# Patient Record
Sex: FEMALE | Race: WHITE | NOT HISPANIC OR LATINO
[De-identification: names, ages, dates, MRNs, and addresses within clinical notes are randomized per-mention and may not be internally consistent; named-entity substitution may affect disease eponyms.]

---

## 2017-04-13 ENCOUNTER — RESULT REVIEW (OUTPATIENT)
Age: 42
End: 2017-04-13

## 2017-04-14 ENCOUNTER — OUTPATIENT (OUTPATIENT)
Dept: OUTPATIENT SERVICES | Facility: HOSPITAL | Age: 42
LOS: 1 days | Discharge: HOME | End: 2017-04-14
Payer: COMMERCIAL

## 2017-04-14 ENCOUNTER — TRANSCRIPTION ENCOUNTER (OUTPATIENT)
Age: 42
End: 2017-04-14

## 2017-04-14 DIAGNOSIS — K62.5 HEMORRHAGE OF ANUS AND RECTUM: ICD-10-CM

## 2017-04-14 DIAGNOSIS — Z98.89 OTHER SPECIFIED POSTPROCEDURAL STATES: Chronic | ICD-10-CM

## 2017-04-14 DIAGNOSIS — C43.22 MALIGNANT MELANOMA OF LEFT EAR AND EXTERNAL AURICULAR CANAL: Chronic | ICD-10-CM

## 2017-04-14 PROCEDURE — 88305 TISSUE EXAM BY PATHOLOGIST: CPT

## 2017-04-14 PROCEDURE — 45380 COLONOSCOPY AND BIOPSY: CPT

## 2017-04-14 PROCEDURE — 88305 TISSUE EXAM BY PATHOLOGIST: CPT | Mod: 26

## 2017-04-19 DIAGNOSIS — I48.91 UNSPECIFIED ATRIAL FIBRILLATION: ICD-10-CM

## 2017-04-19 DIAGNOSIS — E72.12 METHYLENETETRAHYDROFOLATE REDUCTASE DEFICIENCY: ICD-10-CM

## 2017-04-19 DIAGNOSIS — E03.9 HYPOTHYROIDISM, UNSPECIFIED: ICD-10-CM

## 2017-04-19 DIAGNOSIS — K64.8 OTHER HEMORRHOIDS: ICD-10-CM

## 2017-04-19 DIAGNOSIS — D68.51 ACTIVATED PROTEIN C RESISTANCE: ICD-10-CM

## 2017-04-19 DIAGNOSIS — E73.9 LACTOSE INTOLERANCE, UNSPECIFIED: ICD-10-CM

## 2017-04-19 DIAGNOSIS — D12.4 BENIGN NEOPLASM OF DESCENDING COLON: ICD-10-CM

## 2017-04-19 DIAGNOSIS — Z88.3 ALLERGY STATUS TO OTHER ANTI-INFECTIVE AGENTS: ICD-10-CM

## 2017-06-28 ENCOUNTER — RESULT REVIEW (OUTPATIENT)
Age: 42
End: 2017-06-28

## 2018-03-09 ENCOUNTER — LABORATORY RESULT (OUTPATIENT)
Age: 43
End: 2018-03-09

## 2018-03-09 ENCOUNTER — APPOINTMENT (OUTPATIENT)
Dept: SURGICAL ONCOLOGY | Facility: CLINIC | Age: 43
End: 2018-03-09
Payer: COMMERCIAL

## 2018-03-09 VITALS
BODY MASS INDEX: 24.4 KG/M2 | HEART RATE: 107 BPM | WEIGHT: 161 LBS | HEIGHT: 68 IN | SYSTOLIC BLOOD PRESSURE: 128 MMHG | DIASTOLIC BLOOD PRESSURE: 82 MMHG | OXYGEN SATURATION: 100 %

## 2018-03-09 PROCEDURE — 38505 NEEDLE BIOPSY LYMPH NODES: CPT

## 2018-03-09 PROCEDURE — 99214 OFFICE O/P EST MOD 30 MIN: CPT | Mod: 25

## 2018-03-13 ENCOUNTER — FORM ENCOUNTER (OUTPATIENT)
Age: 43
End: 2018-03-13

## 2018-03-14 ENCOUNTER — APPOINTMENT (OUTPATIENT)
Dept: RADIOLOGY | Facility: CLINIC | Age: 43
End: 2018-03-14
Payer: COMMERCIAL

## 2018-03-14 ENCOUNTER — TRANSCRIPTION ENCOUNTER (OUTPATIENT)
Age: 43
End: 2018-03-14

## 2018-03-14 ENCOUNTER — OUTPATIENT (OUTPATIENT)
Dept: OUTPATIENT SERVICES | Facility: HOSPITAL | Age: 43
LOS: 1 days | End: 2018-03-14
Payer: COMMERCIAL

## 2018-03-14 DIAGNOSIS — Z00.8 ENCOUNTER FOR OTHER GENERAL EXAMINATION: ICD-10-CM

## 2018-03-14 DIAGNOSIS — Z98.89 OTHER SPECIFIED POSTPROCEDURAL STATES: Chronic | ICD-10-CM

## 2018-03-14 DIAGNOSIS — C43.22 MALIGNANT MELANOMA OF LEFT EAR AND EXTERNAL AURICULAR CANAL: Chronic | ICD-10-CM

## 2018-03-14 LAB
ALBUMIN SERPL ELPH-MCNC: 4.5 G/DL
ALP BLD-CCNC: 53 U/L
ALT SERPL-CCNC: 15 U/L
ANION GAP SERPL CALC-SCNC: 21 MMOL/L
AST SERPL-CCNC: 17 U/L
BASOPHILS # BLD AUTO: 0.02 K/UL
BASOPHILS NFR BLD AUTO: 0.2 %
BILIRUB SERPL-MCNC: 0.2 MG/DL
BUN SERPL-MCNC: 9 MG/DL
CALCIUM SERPL-MCNC: 9.8 MG/DL
CHLORIDE SERPL-SCNC: 103 MMOL/L
CO2 SERPL-SCNC: 22 MMOL/L
CREAT SERPL-MCNC: 0.91 MG/DL
EOSINOPHIL # BLD AUTO: 0.26 K/UL
EOSINOPHIL NFR BLD AUTO: 2.9 %
GLUCOSE SERPL-MCNC: 83 MG/DL
HCT VFR BLD CALC: 40.6 %
HGB BLD-MCNC: 13.7 G/DL
IMM GRANULOCYTES NFR BLD AUTO: 0.3 %
LDH SERPL-CCNC: 252 U/L
LYMPHOCYTES # BLD AUTO: 2.62 K/UL
LYMPHOCYTES NFR BLD AUTO: 29.6 %
MAN DIFF?: NORMAL
MCHC RBC-ENTMCNC: 28.5 PG
MCHC RBC-ENTMCNC: 33.7 GM/DL
MCV RBC AUTO: 84.6 FL
MONOCYTES # BLD AUTO: 0.56 K/UL
MONOCYTES NFR BLD AUTO: 6.3 %
NEUTROPHILS # BLD AUTO: 5.37 K/UL
NEUTROPHILS NFR BLD AUTO: 60.7 %
PLATELET # BLD AUTO: 299 K/UL
POTASSIUM SERPL-SCNC: 4.3 MMOL/L
PROT SERPL-MCNC: 7.5 G/DL
RBC # BLD: 4.8 M/UL
RBC # FLD: 13.1 %
SODIUM SERPL-SCNC: 146 MMOL/L
WBC # FLD AUTO: 8.86 K/UL

## 2018-03-14 PROCEDURE — 71046 X-RAY EXAM CHEST 2 VIEWS: CPT

## 2018-03-14 PROCEDURE — 71046 X-RAY EXAM CHEST 2 VIEWS: CPT | Mod: 26

## 2018-06-12 ENCOUNTER — RESULT REVIEW (OUTPATIENT)
Age: 43
End: 2018-06-12

## 2018-09-19 ENCOUNTER — APPOINTMENT (OUTPATIENT)
Dept: SURGICAL ONCOLOGY | Facility: CLINIC | Age: 43
End: 2018-09-19
Payer: COMMERCIAL

## 2018-09-19 VITALS
HEIGHT: 68 IN | SYSTOLIC BLOOD PRESSURE: 103 MMHG | DIASTOLIC BLOOD PRESSURE: 69 MMHG | WEIGHT: 154 LBS | BODY MASS INDEX: 23.34 KG/M2 | HEART RATE: 85 BPM | RESPIRATION RATE: 15 BRPM

## 2018-09-19 PROCEDURE — 99214 OFFICE O/P EST MOD 30 MIN: CPT

## 2019-03-20 ENCOUNTER — APPOINTMENT (OUTPATIENT)
Dept: SURGICAL ONCOLOGY | Facility: CLINIC | Age: 44
End: 2019-03-20
Payer: COMMERCIAL

## 2019-03-20 VITALS
HEART RATE: 92 BPM | DIASTOLIC BLOOD PRESSURE: 77 MMHG | HEIGHT: 68 IN | BODY MASS INDEX: 22.73 KG/M2 | SYSTOLIC BLOOD PRESSURE: 114 MMHG | WEIGHT: 150 LBS | RESPIRATION RATE: 15 BRPM

## 2019-03-20 PROCEDURE — 99214 OFFICE O/P EST MOD 30 MIN: CPT

## 2019-03-20 NOTE — HISTORY OF PRESENT ILLNESS
[de-identified] : 42 y/o female presents for follow up of bilateral T1 ear melanomas. \par She is status post wide excision right posterior ear T1 melanoma 12/15 T1 melanoma left posterior ear 8/16 w reconstruction, right chest skin graft. \par \par She reports a recent visit to her dermatologist, no new biopsies. \par LDH levels 3/15/19: 146 within normal limits.\par CXR 3/14/2018: clear lungs\par Left posterior auricular LN no longer palpable.\par \par Today she is without any complaints. \par Denies any new skin lesions, changes, or pruritic moles.\par Denies constitutional symptoms. \par Denies fever or chills.

## 2019-03-20 NOTE — CONSULT LETTER
[Dear  ___] : Dear  [unfilled], [Please see my note below.] : Please see my note below. [Courtesy Letter:] : I had the pleasure of seeing your patient, [unfilled], in my office today. [Sincerely,] : Sincerely, [DrAdan  ___] : Dr. CHANDRA [FreeTextEntry3] : Jose Almanzar MD FACS

## 2019-03-20 NOTE — ADDENDUM
[FreeTextEntry1] : I, Sukhi Wolf, acted solely as a scribe for Dr. Jose Almanzar on this date 3/20/19.

## 2019-03-20 NOTE — ASSESSMENT
[FreeTextEntry1] : Bilateral stage I ear melanomas\par LDH normal 3/2019\par Continue dermatologic surveillance\par RTO 6 months.

## 2019-03-20 NOTE — PHYSICAL EXAM
[Normal] : supple, no neck mass and thyroid not enlarged [Normal Neck Lymph Nodes] : normal neck lymph nodes  [Normal Supraclavicular Lymph Nodes] : normal supraclavicular lymph nodes [Normal Groin Lymph Nodes] : normal groin lymph nodes [Normal Axillary Lymph Nodes] : normal axillary lymph nodes [Normal] : oriented to person, place and time, with appropriate affect [de-identified] : right ear reconstruction well healed. No evidence of recurrence. Left ear reconstruction healed as well. No evidence of recurrence.

## 2019-06-11 ENCOUNTER — RESULT REVIEW (OUTPATIENT)
Age: 44
End: 2019-06-11

## 2019-07-10 ENCOUNTER — TRANSCRIPTION ENCOUNTER (OUTPATIENT)
Age: 44
End: 2019-07-10

## 2019-09-25 ENCOUNTER — APPOINTMENT (OUTPATIENT)
Dept: SURGICAL ONCOLOGY | Facility: CLINIC | Age: 44
End: 2019-09-25
Payer: COMMERCIAL

## 2019-09-25 VITALS
HEIGHT: 68 IN | SYSTOLIC BLOOD PRESSURE: 130 MMHG | WEIGHT: 157 LBS | BODY MASS INDEX: 23.79 KG/M2 | OXYGEN SATURATION: 100 % | RESPIRATION RATE: 15 BRPM | DIASTOLIC BLOOD PRESSURE: 85 MMHG | HEART RATE: 88 BPM

## 2019-09-25 PROCEDURE — 99214 OFFICE O/P EST MOD 30 MIN: CPT

## 2019-09-25 NOTE — PHYSICAL EXAM
[Normal] : supple, no neck mass and thyroid not enlarged [Normal Neck Lymph Nodes] : normal neck lymph nodes  [Normal Supraclavicular Lymph Nodes] : normal supraclavicular lymph nodes [Normal Groin Lymph Nodes] : normal groin lymph nodes [Normal Axillary Lymph Nodes] : normal axillary lymph nodes [Normal] : oriented to person, place and time, with appropriate affect [de-identified] : Bilateral ear reconstruction well healed. No evidence of recurrence. Multiple benign-appearing pigmented lesions head, neck, trunk, and extremities.

## 2019-09-25 NOTE — HISTORY OF PRESENT ILLNESS
[de-identified] : 44 y/o female presents for follow up of bilateral T1 ear melanomas. \par She is status post wide excision right posterior ear T1 melanoma 12/15 T1 melanoma left posterior ear 8/16 w reconstruction, right chest skin graft. \par \par She reports no new biopsies and that she will be following up with her dermatologist shortly.\par LDH levels 3/15/19: 146 within normal limits.\par CXR 3/14/2018: clear lungs\par Left posterior auricular LN no longer palpable.\par \par Diagnostic Left Breast US/MMG (4/5/19): Left-suspicious 4:00 N2 1.2cm dilated ductal structure with echogenic material, possible papilloma. Recommend left breast biopsy. BIRADS-4A\par She reports benign breast biopsy and 6 month follow-up left MMG scheduled for next week.\par \par Today she is without any complaints. \par Denies any new skin lesions, changes, or pruritic moles.\par Denies constitutional symptoms. \par Denies fever or chills.

## 2019-09-25 NOTE — ADDENDUM
[FreeTextEntry1] : I, Manolo Claire, acted solely as a scribe for Dr. Jose Almanzar on this date 09/25/2019.

## 2019-09-25 NOTE — ASSESSMENT
[FreeTextEntry1] : Bilateral stage I ear melanomas\par LDH WNL 3/2019\par Continue dermatologic surveillance\par 6 month follow-up left MMG scheduled for next week\par RTO 6 months.

## 2019-09-25 NOTE — CONSULT LETTER
[Dear  ___] : Dear  [unfilled], [Courtesy Letter:] : I had the pleasure of seeing your patient, [unfilled], in my office today. [Please see my note below.] : Please see my note below. [Sincerely,] : Sincerely, [DrAdan  ___] : Dr. CHANDRA [FreeTextEntry3] : Jose Almanzar MD FACS

## 2020-04-15 ENCOUNTER — APPOINTMENT (OUTPATIENT)
Dept: SURGICAL ONCOLOGY | Facility: CLINIC | Age: 45
End: 2020-04-15

## 2020-09-01 ENCOUNTER — RESULT REVIEW (OUTPATIENT)
Age: 45
End: 2020-09-01

## 2020-11-08 ENCOUNTER — APPOINTMENT (OUTPATIENT)
Dept: DISASTER EMERGENCY | Facility: CLINIC | Age: 45
End: 2020-11-08

## 2020-11-08 DIAGNOSIS — Z01.818 ENCOUNTER FOR OTHER PREPROCEDURAL EXAMINATION: ICD-10-CM

## 2020-11-10 ENCOUNTER — TRANSCRIPTION ENCOUNTER (OUTPATIENT)
Age: 45
End: 2020-11-10

## 2020-11-11 ENCOUNTER — OUTPATIENT (OUTPATIENT)
Dept: OUTPATIENT SERVICES | Facility: HOSPITAL | Age: 45
LOS: 1 days | End: 2020-11-11
Payer: COMMERCIAL

## 2020-11-11 DIAGNOSIS — K62.5 HEMORRHAGE OF ANUS AND RECTUM: ICD-10-CM

## 2020-11-11 DIAGNOSIS — Z98.89 OTHER SPECIFIED POSTPROCEDURAL STATES: Chronic | ICD-10-CM

## 2020-11-11 DIAGNOSIS — C43.22 MALIGNANT MELANOMA OF LEFT EAR AND EXTERNAL AURICULAR CANAL: Chronic | ICD-10-CM

## 2020-11-11 LAB — HCG UR QL: NEGATIVE — SIGNIFICANT CHANGE UP

## 2020-11-11 PROCEDURE — 81025 URINE PREGNANCY TEST: CPT

## 2020-11-11 PROCEDURE — 45378 DIAGNOSTIC COLONOSCOPY: CPT

## 2021-01-24 LAB — SARS-COV-2 N GENE NPH QL NAA+PROBE: NOT DETECTED

## 2021-02-04 ENCOUNTER — APPOINTMENT (OUTPATIENT)
Dept: ORTHOPEDIC SURGERY | Facility: CLINIC | Age: 46
End: 2021-02-04
Payer: COMMERCIAL

## 2021-02-04 VITALS
BODY MASS INDEX: 25.46 KG/M2 | HEIGHT: 68 IN | HEART RATE: 101 BPM | WEIGHT: 168 LBS | SYSTOLIC BLOOD PRESSURE: 115 MMHG | DIASTOLIC BLOOD PRESSURE: 82 MMHG

## 2021-02-04 DIAGNOSIS — M62.830 MUSCLE SPASM OF BACK: ICD-10-CM

## 2021-02-04 PROCEDURE — 73080 X-RAY EXAM OF ELBOW: CPT | Mod: RT

## 2021-02-04 PROCEDURE — 99072 ADDL SUPL MATRL&STAF TM PHE: CPT

## 2021-02-04 PROCEDURE — 99204 OFFICE O/P NEW MOD 45 MIN: CPT | Mod: 25

## 2021-02-04 PROCEDURE — 20605 DRAIN/INJ JOINT/BURSA W/O US: CPT | Mod: RT

## 2021-02-04 RX ORDER — MELOXICAM 7.5 MG/1
7.5 TABLET ORAL
Qty: 21 | Refills: 0 | Status: COMPLETED | COMMUNITY
Start: 2021-02-04 | End: 2021-02-25

## 2021-02-04 RX ORDER — CYCLOBENZAPRINE HYDROCHLORIDE 10 MG/1
10 TABLET, FILM COATED ORAL
Qty: 7 | Refills: 0 | Status: ACTIVE | COMMUNITY
Start: 2021-02-04 | End: 1900-01-01

## 2021-02-20 NOTE — PROCEDURE
[de-identified] : Injection: Right elbow.\par Indication: Lateral epicondylitis.\par \par A discussion was had with the patient regarding this procedure and all questions were answered. All risks, benefits and alternatives were discussed. These included but were not limited to bleeding, infection, and allergic reaction. Alcohol was used to clean the skin, and betadine was used to sterilize and prep the area in the lateral aspect of the right elbow. Ethyl chloride spray was then used as a topical anesthetic. A 25-gauge needle was used to inject 1cc of 1% xylocaine and 1cc of 40mg/mL triamcinolone acetonide into the right lateral epicondyle using a needling technique. A sterile bandage was then applied. The patient tolerated the procedure well and there were no complications\par \par HEP provided to her today

## 2021-02-20 NOTE — HISTORY OF PRESENT ILLNESS
[Worsening] : worsening [___ mths] : [unfilled] month(s) ago [3] : an average pain level of 3/10 [de-identified] : SAMSON ROBERTS is a 45 year female being seen for R elbow pain that began 1 month ago. Patient reports the pain is worst at night and when she first wakes up in the morning. She admits to pain and weaknesx in both the R elbow and R hand, espeically when she is grabbingh. She also reports shooting pain and numbness and tingling in the R UE. Patient reports taking naproxen for pain which has provided minimal relief. P/x has h/x of SLAP repair in 2010/2011. \par  [de-identified] : grabbing, sleeping

## 2021-02-20 NOTE — PHYSICAL EXAM
[de-identified] : Physical Exam:\par General: Well appearing, no acute distress\par Neurologic: A&Ox3, No focal deficits\par Head: NCAT without abrasions, lacerations, or ecchymosis to head, face, or scalp\par Eyes: No scleral icterus, no gross abnormalities\par Respiratory: Equal chest wall expansion bilaterally, no accessory muscle use\par Lymphatic: No lymphadenopathy palpated\par Skin: Warm and dry\par Psychiatric: Normal mood and affect\par \par Elbow Exam\par \par Skin: Clean, dry, intact. No ecchymosis. No swelling. No palpable joint effusion.\par ROM: RIGHT 0-130, full supination/pronation.  LEFT 0-140, full supination/pronation.\par Painful ROM: Pronation to lateral right elbow\par Tenderness: [No] medial epicondyle pain. Lateral epicondyle pain. [No] olecranon pain. No pain at radial head.\par Strength: 5/5 elbow flexion, 5/5 elbow extension, 5/5 supination, 5/5 pronation\par Stability: Stable to vaus/valgus stress\par Vasc: 2+ radial pulse, <2s cap refill\par Sensation: In tact to light touch throughout\par Neuro: Negative tinels at ulnar canal, AIN/PIN/Ulnar nerve in tact to motor/sensation.\par \par Special Tests:\par Dorsiflexion Against Resistance: Negative\par Flexion of Wrist Against Resistance: POS\par Resistance Against Pronation: POS\par Resistance Against Supination: Negative\par Tinel's Sign Cubital Tunnel: Negative [de-identified] : 3 views of R elbow obtained today reveals no dislocation, bony lesions, or deformities. No signs of degenerative changes.

## 2021-02-20 NOTE — DISCUSSION/SUMMARY
[de-identified] : SAMSON is a 45 year female who presents today with complaints of right elbow pain secondary to lateral epicondylitis.\par \par I discussed with the patient the treatment of lateral epicondylitis. I discussed that this is a degenerative condition rather than an inflammatory process. The process is reversible, and the best source of treatment is to reduce the offending repetitive overuse injury process. I counseled the patient how to do this. In addition, treatment includes counterforce bracing, physical therapy for stretching and strengthening, and the use of injection therapy (steroids/PRP etc). I also discussed the role of surgical intervention for may become a chronic condition.\par \par At this time she elected for conservative measures with elbow compression band during all daytime activities, wrist splint for all nighttime use which was provided to her today, formal physical therapy starting in 1 week from today if the HEP provided her doesn't help her within 2 weeks of performing, and a prescription for meloxicam once daily for 21 days. I counseled the patient on the side effects of meloxicam and the possible negative sequelae.. If she does develop any acid reflux or abdominal pain they should stop the medication and then call us. We'll see her back in 6-8 weeks for clinical reassessment. We also discussed her muscle spasm to her back of which I provided her with flexiril for QHS prn. She will utilize heat and massage for this, otherwise. She agrees with the above plan all questions were answered.

## 2021-09-16 ENCOUNTER — RESULT REVIEW (OUTPATIENT)
Age: 46
End: 2021-09-16

## 2021-10-27 ENCOUNTER — NON-APPOINTMENT (OUTPATIENT)
Age: 46
End: 2021-10-27

## 2021-11-01 ENCOUNTER — APPOINTMENT (OUTPATIENT)
Dept: ORTHOPEDIC SURGERY | Facility: CLINIC | Age: 46
End: 2021-11-01
Payer: COMMERCIAL

## 2021-11-01 VITALS
HEART RATE: 84 BPM | DIASTOLIC BLOOD PRESSURE: 78 MMHG | BODY MASS INDEX: 25.46 KG/M2 | SYSTOLIC BLOOD PRESSURE: 112 MMHG | HEIGHT: 68 IN | WEIGHT: 168 LBS

## 2021-11-01 DIAGNOSIS — S46.811A STRAIN OF OTHER MUSCLES, FASCIA AND TENDONS AT SHOULDER AND UPPER ARM LEVEL, RIGHT ARM, INITIAL ENCOUNTER: ICD-10-CM

## 2021-11-01 PROCEDURE — 99214 OFFICE O/P EST MOD 30 MIN: CPT | Mod: 25

## 2021-11-01 PROCEDURE — 20605 DRAIN/INJ JOINT/BURSA W/O US: CPT | Mod: RT

## 2021-11-01 RX ORDER — DICLOFENAC SODIUM 75 MG/1
75 TABLET, DELAYED RELEASE ORAL
Qty: 60 | Refills: 0 | Status: ACTIVE | COMMUNITY
Start: 2021-11-01 | End: 1900-01-01

## 2021-11-01 NOTE — HISTORY OF PRESENT ILLNESS
[Worsening] : worsening [3] : an average pain level of 3/10 [___ mths] : [unfilled] month(s) ago [de-identified] : SAMSON ROBERTS is a 45 year female being seen for f/u visit of R elbow pain that began 10 month ago. Currently, Patient reports the pain is worst at night and when she first wakes up in the morning. She admits to pain and weakness in both the R elbow and R hand, especially when she is grabbing. She also reports shooting pain and numbness and tingling in the R UE. Patient reports taking naproxen for pain which has provided minimal relief. P/x has h/x of SLAP repair in 2010/2011.  [de-identified] : grabbing, sleeping

## 2021-11-01 NOTE — ADDENDUM
[FreeTextEntry1] : Documented by Malik Hoffman acting as a scribe for Dr. Pereyra on 11/01/2021. \par \par All medical record entries made by the Scribe were at my, Dr. Pereyra's, direction and\par personally dictated by me on 11/01/2021. I have reviewed the chart and agree that the record\par accurately reflects my personal performance of the history, physical exam, procedure and imaging.

## 2021-11-01 NOTE — DISCUSSION/SUMMARY
[de-identified] : SAMSON ROBERTS is a 45 year female being seen for f/u visit of R elbow pain that began 10 month ago. Currently, Patient reports the pain is worst at night and when she first wakes up in the morning. She admits to pain and weakness in both the R elbow and R hand, especially when she is grabbing. She also reports shooting pain and numbness and tingling in the R UE. Patient reports taking naproxen for pain which has provided minimal relief. P/x has h/x of SLAP repair in 2010/2011. \par \par I discussed with the patient the treatment of lateral epicondylitis. I discussed that this is a degenerative condition rather than an inflammatory process. The process is reversible, and the best source of treatment is to reduce the offending repetitive overuse injury process. I counseled the patient how to do this. In addition, treatment includes counterforce bracing, physical therapy for stretching and strengthening, and the use of injection therapy (steroids/PRP etc). I also discussed the role of surgical intervention for may become a chronic condition.\par \par Pt due to her acute pain elected for a corticosteroid injection at today's visit and tolerated the procedure well. She should take it easy for the next 2-3 days while icing the joint. Conservative measures of treatment include rest until asymptomatic, activity avoidance, NSAID's PRN, application to ice to the area 2-3x daily for 20 minutes, with gradual return to activities. A prescription of Diclofenac was given and patient was informed about its risks and benefits. Patient will follow up in 6-8 wks for repeat clinical assessment. All questions were answered and the patient verbalized understanding. The patient is in agreement with this treatment plan. \par \par \par For her trapezial strain I recommend oral anti-inflammatories and physical therapy.  She agrees with above plan.\par \par

## 2021-11-01 NOTE — PROCEDURE
[de-identified] : Injection: Right elbow.\par Indication: Lateral epicondylitis.\par \par A discussion was had with the patient regarding this procedure and all questions were answered. All risks, benefits and alternatives were discussed. These included but were not limited to bleeding, infection, and allergic reaction. Alcohol was used to clean the skin, and betadine was used to sterilize and prep the area in the lateral aspect of the elbow. Ethyl chloride spray was then used as a topical anesthetic. A 25-gauge needle was used to inject 1cc of 1% xylocaine and 1cc of 40mg/mL triamcinolone acetonide into the lateral epicondyle using a needling technique. A sterile bandage was then applied. The patient tolerated the procedure well and there were no complications

## 2021-11-01 NOTE — PHYSICAL EXAM
[de-identified] : Physical Exam:\par General: Well appearing, no acute distress\par Neurologic: A&Ox3, No focal deficits\par Head: NCAT without abrasions, lacerations, or ecchymosis to head, face, or scalp\par Eyes: No scleral icterus, no gross abnormalities\par Respiratory: Equal chest wall expansion bilaterally, no accessory muscle use\par Lymphatic: No lymphadenopathy palpated\par Skin: Warm and dry\par Psychiatric: Normal mood and affect\par \par Elbow Exam\par \par Skin: Clean, dry, intact. No ecchymosis. No swelling. No palpable joint effusion.\par ROM: RIGHT 0-130, full supination/pronation.  LEFT 0-140, full supination/pronation.\par Painful ROM: Pronation to lateral right elbow\par Tenderness: [No] medial epicondyle pain. Lateral epicondyle pain. [No] olecranon pain. No pain at radial head.\par Strength: 5/5 elbow flexion, 5/5 elbow extension, 5/5 supination, 5/5 pronation\par Stability: Stable to vaus/valgus stress\par Vasc: 2+ radial pulse, <2s cap refill\par Sensation: In tact to light touch throughout\par Neuro: Negative tinels at ulnar canal, AIN/PIN/Ulnar nerve in tact to motor/sensation.\par \par Special Tests:\par Dorsiflexion Against Resistance: Negative\par Flexion of Wrist Against Resistance: POS\par Resistance Against Pronation: POS\par Resistance Against Supination: Negative\par Tinel's Sign Cubital Tunnel: Negative

## 2022-02-22 ENCOUNTER — APPOINTMENT (OUTPATIENT)
Dept: ORTHOPEDIC SURGERY | Facility: CLINIC | Age: 47
End: 2022-02-22
Payer: COMMERCIAL

## 2022-02-22 PROCEDURE — 99442: CPT

## 2022-02-22 RX ORDER — PREDNISONE 5 MG/1
5 TABLET ORAL
Qty: 35 | Refills: 0 | Status: ACTIVE | COMMUNITY
Start: 2022-02-22 | End: 1900-01-01

## 2022-03-08 ENCOUNTER — APPOINTMENT (OUTPATIENT)
Dept: ORTHOPEDIC SURGERY | Facility: CLINIC | Age: 47
End: 2022-03-08
Payer: COMMERCIAL

## 2022-03-08 PROCEDURE — 99442: CPT

## 2022-04-18 ENCOUNTER — APPOINTMENT (OUTPATIENT)
Dept: ORTHOPEDIC SURGERY | Facility: CLINIC | Age: 47
End: 2022-04-18
Payer: COMMERCIAL

## 2022-04-18 VITALS
HEART RATE: 89 BPM | SYSTOLIC BLOOD PRESSURE: 152 MMHG | HEIGHT: 68 IN | DIASTOLIC BLOOD PRESSURE: 95 MMHG | WEIGHT: 168 LBS | BODY MASS INDEX: 25.46 KG/M2

## 2022-04-18 DIAGNOSIS — Z87.39 PERSONAL HISTORY OF OTHER DISEASES OF THE MUSCULOSKELETAL SYSTEM AND CONNECTIVE TISSUE: ICD-10-CM

## 2022-04-18 DIAGNOSIS — S53.431D: ICD-10-CM

## 2022-04-18 DIAGNOSIS — G56.21 LESION OF ULNAR NERVE, RIGHT UPPER LIMB: ICD-10-CM

## 2022-04-18 DIAGNOSIS — S53.431A: ICD-10-CM

## 2022-04-18 PROCEDURE — 99214 OFFICE O/P EST MOD 30 MIN: CPT | Mod: 25

## 2022-04-18 PROCEDURE — 99072 ADDL SUPL MATRL&STAF TM PHE: CPT

## 2022-04-18 PROCEDURE — 76942 ECHO GUIDE FOR BIOPSY: CPT | Mod: RT

## 2022-04-18 PROCEDURE — 20551 NJX 1 TENDON ORIGIN/INSJ: CPT | Mod: RT

## 2022-04-18 NOTE — PROCEDURE
[de-identified] : Injection: US guided Right elbow.\par Indication: Lateral epicondylitis.\par \par A discussion was had with the patient regarding this procedure and all questions were answered. All risks, benefits and alternatives were discussed. These included but were not limited to bleeding, infection, and allergic reaction. Alcohol was used to clean the skin, and betadine was used to sterilize and prep the area in the lateral aspect of the elbow. Ethyl chloride spray was then used as a topical anesthetic. A 25-gauge needle was used to inject 2 cc of 0.25% bupivacaine, 1cc of 1% lidocaine and 1cc of 40mg/mL triamcinolone acetonide into the lateral epicondyle using a needling technique. An ultrasound guidance was used for adequate placement of needle. A sterile bandage was then applied. The patient tolerated the procedure well and there were no complications

## 2022-04-18 NOTE — DISCUSSION/SUMMARY
[de-identified] : I had a lengthy discussion with the patient regarding their current condition. We discussed the treatment options including operative and nonoperative management. Pt due to her acute pain elected for a corticosteroid injection at today's visit and tolerated the procedure well. She should take it easy for the next 2-3 days while icing the joint. Conservative measures of treatment include rest until asymptomatic, activity avoidance, NSAID's PRN, application to ice to the area 2-3x daily for 20 minutes, with gradual return to activities. Patient will follow up in 6-8 wks for repeat clinical assessment. All questions were answered and the patient verbalized understanding. The patient is in agreement with this treatment plan.

## 2022-04-18 NOTE — HISTORY OF PRESENT ILLNESS
[Worsening] : worsening [___ mths] : [unfilled] month(s) ago [3] : a current pain level of 3/10 [4] : an average pain level of 4/10 [de-identified] : SAMSON ROBERTS is a 45 year female being seen for f/u visit of R elbow pain. Currently, she c/o lateral elbow pain. Occasional numbness in ulnar two fingers. She denies feelings of instability, clunking or snapping. Working with PT, using bracing. Has had two prior cortisone injections into the lateral epicondyle in the past. She presents for in office MRI review. [de-identified] : grabbing, sleeping

## 2022-04-18 NOTE — ADDENDUM
[FreeTextEntry1] : Documented by Malik Hoffman acting as a scribe for Dr. Pereyra on 04/18/2022. \par \par All medical record entries made by the Scribe were at my, Dr. Pereyra's, direction and\par personally dictated by me on 04/18/2022. I have reviewed the chart and agree that the record\par accurately reflects my personal performance of the history, physical exam, procedure and imaging.

## 2022-04-18 NOTE — PHYSICAL EXAM
[de-identified] : Physical Exam:\par General: Well appearing, no acute distress\par Neurologic: A&Ox3, No focal deficits\par Head: NCAT without abrasions, lacerations, or ecchymosis to head, face, or scalp\par Eyes: No scleral icterus, no gross abnormalities\par Respiratory: Equal chest wall expansion bilaterally, no accessory muscle use\par Lymphatic: No lymphadenopathy palpated\par Skin: Warm and dry\par Psychiatric: Normal mood and affect\par \par Elbow Exam\par \par Skin: Clean, dry, intact. No ecchymosis. No swelling. No palpable joint effusion. TTP over lateral epicondyle. Mild TTP over cubital tunnel.\par ROM: RIGHT 0-130, full supination/pronation.  LEFT 0-140, full supination/pronation.\par Painful ROM: Pronation to lateral right elbow\par Tenderness: - medial epicondyle pain. Lateral epicondyle pain. No olecranon pain. No pain at radial head.\par Strength: 5/5 elbow flexion, 5/5 elbow extension, 5/5 supination, 5/5 pronation\par Stability: Stable to vaus/valgus stress when compared bilaterally\par Vasc: 2+ radial pulse, <2s cap refill\par Sensation: In tact to light touch throughout\par Neuro: Negative tinels at ulnar canal, AIN/PIN/Ulnar nerve in tact to motor/sensation.\par \par Special Tests:\par Dorsiflexion Against Resistance: Negative\par Flexion of Wrist Against Resistance: POS\par Resistance Against Pronation: POS\par Resistance Against Supination: Negative\par Tinel's Sign Cubital Tunnel: + without instability of ulnar nerve. [de-identified] : MRI R elbow without contrast at [facility] radiology on 3/4/22 is reviewed. This shows a full thickness tear of the RCL at the origin with high grade tearing of the common extensor tendon origin.

## 2022-11-09 ENCOUNTER — APPOINTMENT (OUTPATIENT)
Dept: SURGICAL ONCOLOGY | Facility: CLINIC | Age: 47
End: 2022-11-09

## 2022-11-09 VITALS
HEART RATE: 86 BPM | BODY MASS INDEX: 25.46 KG/M2 | SYSTOLIC BLOOD PRESSURE: 121 MMHG | DIASTOLIC BLOOD PRESSURE: 88 MMHG | WEIGHT: 168 LBS | RESPIRATION RATE: 16 BRPM | OXYGEN SATURATION: 100 % | HEIGHT: 68 IN

## 2022-11-09 DIAGNOSIS — C43.20 MALIGNANT MELANOMA OF UNSPECIFIED EAR AND EXTERNAL AURICULAR CANAL: ICD-10-CM

## 2022-11-09 PROCEDURE — 99214 OFFICE O/P EST MOD 30 MIN: CPT

## 2022-11-09 NOTE — PHYSICAL EXAM
[Normal] : supple, no neck mass and thyroid not enlarged [Normal Neck Lymph Nodes] : normal neck lymph nodes  [Normal Supraclavicular Lymph Nodes] : normal supraclavicular lymph nodes [Normal Groin Lymph Nodes] : normal groin lymph nodes [Normal Axillary Lymph Nodes] : normal axillary lymph nodes [Normal] : oriented to person, place and time, with appropriate affect [de-identified] : Bilateral ear reconstruction well healed. No evidence of recurrence. Multiple benign-appearing pigmented lesions head, neck, trunk, and extremities.

## 2022-11-09 NOTE — ASSESSMENT
[FreeTextEntry1] : Bilateral stage I ear melanomas\par Normal PE \par Continue dermatologic surveillance\par Will get chest x-ray and yearly blood work including LDH level now \par RTO 1 year

## 2022-11-09 NOTE — ADDENDUM
[FreeTextEntry1] : I, Elizabeth Tristan, acted solely as a scribe for Dr. Jose Almanzar on this date 11/09/2022.\par

## 2022-11-09 NOTE — HISTORY OF PRESENT ILLNESS
[de-identified] : Patient is a 46 y/o female who presents an initial consultation, She was last seen in 2019 for bilateral T1 ear melanomas. \par She is status post wide excision right posterior ear T1 melanoma 12/15 T1 melanoma left posterior ear 8/16 w reconstruction, right chest skin graft. \par She continues dermatologic surveillance at [facility] and reports no new biopsies from 2 weeks ago. \par \par LDH level May 2020: 147 (wnl)\par LDH levels 3/15/19: 146 within normal limits.\par CXR 3/14/2018: clear lungs\par Left posterior auricular LN no longer palpable.\par \par Diagnostic Left Breast US/MMG (4/5/19): Left-suspicious 4:00 N2 1.2cm dilated ductal structure with echogenic material, possible papilloma. Recommend left breast biopsy. BI-RADS 4A\par She reports benign breast biopsy and 6 month follow-up left MMG. \par \par Today she is without any complaints. \par Denies any new skin lesions, changes, or pruritic moles.\par Denies constitutional symptoms. \par Denies fever or chills.

## 2023-01-25 ENCOUNTER — OUTPATIENT (OUTPATIENT)
Dept: OUTPATIENT SERVICES | Facility: HOSPITAL | Age: 48
LOS: 1 days | End: 2023-01-25
Payer: COMMERCIAL

## 2023-01-25 ENCOUNTER — NON-APPOINTMENT (OUTPATIENT)
Age: 48
End: 2023-01-25

## 2023-01-25 ENCOUNTER — APPOINTMENT (OUTPATIENT)
Dept: RADIOLOGY | Facility: CLINIC | Age: 48
End: 2023-01-25
Payer: COMMERCIAL

## 2023-01-25 DIAGNOSIS — C43.22 MALIGNANT MELANOMA OF LEFT EAR AND EXTERNAL AURICULAR CANAL: Chronic | ICD-10-CM

## 2023-01-25 DIAGNOSIS — Z98.89 OTHER SPECIFIED POSTPROCEDURAL STATES: Chronic | ICD-10-CM

## 2023-01-25 DIAGNOSIS — C43.20 MALIGNANT MELANOMA OF UNSPECIFIED EAR AND EXTERNAL AURICULAR CANAL: ICD-10-CM

## 2023-01-25 LAB
ALBUMIN SERPL ELPH-MCNC: 4.4 G/DL
ALP BLD-CCNC: 62 U/L
ALT SERPL-CCNC: 11 U/L
ANION GAP SERPL CALC-SCNC: 10 MMOL/L
AST SERPL-CCNC: 14 U/L
BASOPHILS # BLD AUTO: 0.06 K/UL
BASOPHILS NFR BLD AUTO: 0.7 %
BILIRUB SERPL-MCNC: 0.2 MG/DL
BUN SERPL-MCNC: 13 MG/DL
CALCIUM SERPL-MCNC: 9.6 MG/DL
CHLORIDE SERPL-SCNC: 101 MMOL/L
CO2 SERPL-SCNC: 27 MMOL/L
CREAT SERPL-MCNC: 0.78 MG/DL
EGFR: 93 ML/MIN/1.73M2
EOSINOPHIL # BLD AUTO: 0.22 K/UL
EOSINOPHIL NFR BLD AUTO: 2.6 %
GLUCOSE SERPL-MCNC: 93 MG/DL
HCT VFR BLD CALC: 38.2 %
HGB BLD-MCNC: 12.9 G/DL
IMM GRANULOCYTES NFR BLD AUTO: 0.4 %
LDH SERPL-CCNC: 208 U/L
LYMPHOCYTES # BLD AUTO: 2.25 K/UL
LYMPHOCYTES NFR BLD AUTO: 26.7 %
MAN DIFF?: NORMAL
MCHC RBC-ENTMCNC: 28.3 PG
MCHC RBC-ENTMCNC: 33.8 GM/DL
MCV RBC AUTO: 83.8 FL
MONOCYTES # BLD AUTO: 0.57 K/UL
MONOCYTES NFR BLD AUTO: 6.8 %
NEUTROPHILS # BLD AUTO: 5.29 K/UL
NEUTROPHILS NFR BLD AUTO: 62.8 %
PLATELET # BLD AUTO: 289 K/UL
POTASSIUM SERPL-SCNC: 3.8 MMOL/L
PROT SERPL-MCNC: 7.3 G/DL
RBC # BLD: 4.56 M/UL
RBC # FLD: 12.7 %
SODIUM SERPL-SCNC: 138 MMOL/L
WBC # FLD AUTO: 8.42 K/UL

## 2023-01-25 PROCEDURE — 71046 X-RAY EXAM CHEST 2 VIEWS: CPT | Mod: 26

## 2023-01-25 PROCEDURE — 71046 X-RAY EXAM CHEST 2 VIEWS: CPT

## 2023-05-18 ENCOUNTER — APPOINTMENT (OUTPATIENT)
Dept: ORTHOPEDIC SURGERY | Facility: CLINIC | Age: 48
End: 2023-05-18
Payer: COMMERCIAL

## 2023-05-18 DIAGNOSIS — M77.11 LATERAL EPICONDYLITIS, RIGHT ELBOW: ICD-10-CM

## 2023-05-18 PROCEDURE — 99214 OFFICE O/P EST MOD 30 MIN: CPT | Mod: 25

## 2023-05-18 PROCEDURE — 20606 DRAIN/INJ JOINT/BURSA W/US: CPT | Mod: RT

## 2023-05-18 NOTE — DISCUSSION/SUMMARY
[de-identified] : We had a thorough discussion regarding the nature of her pain, the pathophysiology, as well as all treatment options. I discussed operative and non-operative treatment modalities. Pt due to her acute pain elected for an ultrasound guided corticosteroid injection at today's visit and tolerated the procedure well. She should take it easy for the next 2-3 days while icing the joint. I have referred/ordered the patient for PRP injections. All questions were answered and the patient verbalized understanding. The patient is in agreement with this treatment plan.

## 2023-05-18 NOTE — PROCEDURE
[de-identified] : US guided Injection: Right (R) elbow. \par Indication: Lateral epicondylitis.  \par \par A discussion was had with the patient regarding this procedure and all questions were answered. All risks, benefits and alternatives were discussed. These included but were not limited to bleeding, infection, and allergic reaction. Alcohol was used to clean the skin, and betadine was used to sterilize and prep the area in the lateral aspect of the elbow. Ethyl chloride spray was then used as a topical anesthetic. A 25-gauge needle was used to inject 2 cc of 0.25% bupivacaine, 1cc of 1% xylocaine and 1cc of 40mg/mL triamcinolone acetonide into the lateral epicondyle using a needling technique. An ultrasound guidance was used for adequate placement of needle. A sterile bandage was then applied. The patient tolerated the procedure well and there were no complications

## 2023-05-18 NOTE — ADDENDUM
[FreeTextEntry1] : Documented by Ana Telles acting as a scribe for Dr. Pereyra and Toney Ureña PA-C on 05/18/2023.   All medical record entries made by the Scribe were at my, Dr. Pereyra, and Toney Ureña's, direction and personally dictated by me on 05/18/2023. I have reviewed the chart and agree that the record accurately reflects my personal performance of the history, physical exam, procedure and imaging.

## 2023-05-18 NOTE — PHYSICAL EXAM
[de-identified] : Physical Exam:\par General: Well appearing, no acute distress\par Neurologic: A&Ox3, No focal deficits\par Head: NCAT without abrasions, lacerations, or ecchymosis to head, face, or scalp\par Eyes: No scleral icterus, no gross abnormalities\par Respiratory: Equal chest wall expansion bilaterally, no accessory muscle use\par Lymphatic: No lymphadenopathy palpated\par Skin: Warm and dry\par Psychiatric: Normal mood and affect\par \par Elbow Exam\par \par Skin: Clean, dry, intact. No ecchymosis. No swelling. No palpable joint effusion. TTP over lateral epicondyle. Mild TTP over cubital tunnel.\par ROM: RIGHT 0-130, full supination/pronation.  LEFT 0-140, full supination/pronation.\par Painful ROM: Pronation to lateral right elbow\par Tenderness: - medial epicondyle pain. Lateral epicondyle pain. No olecranon pain. No pain at radial head.\par Strength: 5/5 elbow flexion, 5/5 elbow extension, 5/5 supination, 5/5 pronation\par Stability: Stable to vaus/valgus stress when compared bilaterally\par Vasc: 2+ radial pulse, <2s cap refill\par Sensation: In tact to light touch throughout\par Neuro: Negative tinels at ulnar canal, AIN/PIN/Ulnar nerve in tact to motor/sensation.\par \par Special Tests:\par Dorsiflexion Against Resistance: Negative\par Flexion of Wrist Against Resistance: POS\par Resistance Against Pronation: POS\par Resistance Against Supination: Negative\par Tinel's Sign Cubital Tunnel: + without instability of ulnar nerve.

## 2024-05-01 ENCOUNTER — APPOINTMENT (OUTPATIENT)
Dept: UROGYNECOLOGY | Facility: CLINIC | Age: 49
End: 2024-05-01
Payer: COMMERCIAL

## 2024-05-01 VITALS
SYSTOLIC BLOOD PRESSURE: 119 MMHG | DIASTOLIC BLOOD PRESSURE: 82 MMHG | HEART RATE: 87 BPM | WEIGHT: 170 LBS | BODY MASS INDEX: 25.76 KG/M2 | HEIGHT: 68 IN

## 2024-05-01 DIAGNOSIS — R39.15 URGENCY OF URINATION: ICD-10-CM

## 2024-05-01 DIAGNOSIS — N36.41 HYPERMOBILITY OF URETHRA: ICD-10-CM

## 2024-05-01 LAB
BILIRUB UR QL STRIP: NORMAL
CLARITY UR: CLEAR
COLLECTION METHOD: NORMAL
GLUCOSE UR-MCNC: NORMAL
HCG UR QL: 0.2 EU/DL
HGB UR QL STRIP.AUTO: ABNORMAL
KETONES UR-MCNC: NORMAL
LEUKOCYTE ESTERASE UR QL STRIP: NORMAL
NITRITE UR QL STRIP: NORMAL
PH UR STRIP: 6
PROT UR STRIP-MCNC: NORMAL
SP GR UR STRIP: 1.02

## 2024-05-01 PROCEDURE — 51701 INSERT BLADDER CATHETER: CPT

## 2024-05-01 PROCEDURE — 99244 OFF/OP CNSLTJ NEW/EST MOD 40: CPT | Mod: 25

## 2024-05-01 PROCEDURE — 99459 PELVIC EXAMINATION: CPT

## 2024-05-01 NOTE — PHYSICAL EXAM
[Chaperone Present] : A chaperone was present in the examining room during all aspects of the physical examination [08305] : A chaperone was present during the pelvic exam. [Labia Majora] : were normal [Normal Appearance] : general appearance was normal [FreeTextEntry1] :  General: Well, appearing. Alert and orientated. No acute distress HEENT: Normocephalic, atraumatic and extraocular muscles appear to be intact  Neck: Full range of motion, no obvious lymphadenopathy, deformities, or masses noted  Respiratory: Speaking in full sentences comfortably, normal work of breathing and no cough during visit Musculoskeletal: active full range of motion in extremities  Extremities: No upper extremity edema noted Skin: no obvious rash or skin lesions Neuro: Orientated X 3, speech is fluent, normal rate Psych: Normal mood and affect    [Tenderness] : ~T no ~M abdominal tenderness observed [Distended] : not distended [Straining ____ degree] : Q-Tip Test: Straining [unfilled] degree [Normal] : normal [Normal rectal exam] : was normal

## 2024-05-01 NOTE — DISCUSSION/SUMMARY
[FreeTextEntry1] :  Patient presents with symptoms of mixed urinary incontinence, both components equally bothersome. We discussed possible etiologies of her symptoms including both stress urinary incontinence and overactive bladder. We reviewed management options for both conditions. I recommend further workup of her urinary symptoms with urodynamic testing. We reviewed behavioral and fluid modifications. Written and verbal instructions were provided to her as well. She will return to my office for urodynamics and follow up with me to discuss results and management options further.   The following treatment plan was designed for this patient and provided to her in written form and reviewed extensively. Patient was given a copy to take home:   Overactive bladder (frequent urination, urinary urgency, difficulty holding urine) -Total fluid intake: 1.5 -2 L daily Ex. 8 oz coffee OR tea (not both!), 2-3 bottles of water (each is 16.9 oz) Drink water slowly (bottle should take hours to finish, not minutes). Don't binge drink! Do not add anything to the water (no crystal light, or flavoring)  Avoid: 2nd cup of coffee or tea (even if decaf), iced tea, carbonation (soda, seltzer, sparkling water), alcohol, citrus, spicy foods, artificial sweeteners, chocolate  Stop eating and drinking 2-3 hours before bedtime (sips are ok)  -Try the above fluid changes and bladder training (instructions attached). If no improvement after 6-8 weeks, will consider starting a medication for overactive bladder.   Stress urinary incontinence (leakage with coughing, sneezing, lifting, exercise, sudden movements)  - Will schedule Urodynamics test in my [place] office. Urodynamics test evaluates your bladder function and diagnoses leakage conditions. Test takes ~20 minutes, however appointment is ~45-60 minutes long. There is no preparation that you need to do before the test. There are no activity restrictions after the test.    -You will have a follow up appointment to discuss test results and treatment options    IUGA info on BT and URD provided as well

## 2024-05-01 NOTE — HISTORY OF PRESENT ILLNESS
[Rectal Prolapse] : no [Unable To Restrain Bowel Movement] : moderate [Feelings Of Urinary Urgency] : no [x1] : nocturia once nightly [] : yes [Urinary Tract Infection] : moderate [Stool Visible Blood] : no [Incomplete Emptying Of Stool] : no [FreeTextEntry1] :  PMH: Factor V Leiden (took lovenox during pregnancy and heparin after, no h/o clot), MTHFR mutation, sinus arrthymia PSH: laparoscopic surgery for endo On wegovy  daily fluid intake: diet soda, water with crystal light, seltzer, coffee

## 2024-05-02 ENCOUNTER — NON-APPOINTMENT (OUTPATIENT)
Age: 49
End: 2024-05-02

## 2024-05-02 LAB
APPEARANCE: CLEAR
BACTERIA: NEGATIVE /HPF
BILIRUBIN URINE: NEGATIVE
BLOOD URINE: NEGATIVE
CAST: 0 /LPF
COLOR: YELLOW
EPITHELIAL CELLS: 1 /HPF
GLUCOSE QUALITATIVE U: NEGATIVE MG/DL
KETONES URINE: NEGATIVE MG/DL
LEUKOCYTE ESTERASE URINE: NEGATIVE
MICROSCOPIC-UA: NORMAL
NITRITE URINE: NEGATIVE
PH URINE: 6
PROTEIN URINE: NEGATIVE MG/DL
RED BLOOD CELLS URINE: 1 /HPF
SPECIFIC GRAVITY URINE: 1.02
UROBILINOGEN URINE: 1 MG/DL
WHITE BLOOD CELLS URINE: 0 /HPF

## 2024-05-03 ENCOUNTER — NON-APPOINTMENT (OUTPATIENT)
Age: 49
End: 2024-05-03

## 2024-05-03 DIAGNOSIS — Z86.718 PERSONAL HISTORY OF OTHER VENOUS THROMBOSIS AND EMBOLISM: ICD-10-CM

## 2024-05-03 DIAGNOSIS — Z78.9 OTHER SPECIFIED HEALTH STATUS: ICD-10-CM

## 2024-05-03 DIAGNOSIS — I49.9 CARDIAC ARRHYTHMIA, UNSPECIFIED: ICD-10-CM

## 2024-05-03 DIAGNOSIS — Z87.42 PERSONAL HISTORY OF OTHER DISEASES OF THE FEMALE GENITAL TRACT: ICD-10-CM

## 2024-05-03 LAB — BACTERIA UR CULT: NORMAL

## 2024-06-10 ENCOUNTER — NON-APPOINTMENT (OUTPATIENT)
Age: 49
End: 2024-06-10

## 2024-06-10 ENCOUNTER — OUTPATIENT (OUTPATIENT)
Dept: OUTPATIENT SERVICES | Facility: HOSPITAL | Age: 49
LOS: 1 days | End: 2024-06-10
Payer: COMMERCIAL

## 2024-06-10 ENCOUNTER — APPOINTMENT (OUTPATIENT)
Dept: UROGYNECOLOGY | Facility: CLINIC | Age: 49
End: 2024-06-10
Payer: COMMERCIAL

## 2024-06-10 DIAGNOSIS — Z98.89 OTHER SPECIFIED POSTPROCEDURAL STATES: Chronic | ICD-10-CM

## 2024-06-10 DIAGNOSIS — Z01.818 ENCOUNTER FOR OTHER PREPROCEDURAL EXAMINATION: ICD-10-CM

## 2024-06-10 DIAGNOSIS — N39.3 STRESS INCONTINENCE (FEMALE) (MALE): ICD-10-CM

## 2024-06-10 DIAGNOSIS — C43.22 MALIGNANT MELANOMA OF LEFT EAR AND EXTERNAL AURICULAR CANAL: Chronic | ICD-10-CM

## 2024-06-10 PROCEDURE — 51797 INTRAABDOMINAL PRESSURE TEST: CPT | Mod: 26

## 2024-06-10 PROCEDURE — 51729 CYSTOMETROGRAM W/VP&UP: CPT

## 2024-06-10 PROCEDURE — 51784 ANAL/URINARY MUSCLE STUDY: CPT | Mod: 26

## 2024-06-10 PROCEDURE — 51784 ANAL/URINARY MUSCLE STUDY: CPT

## 2024-06-10 PROCEDURE — 51797 INTRAABDOMINAL PRESSURE TEST: CPT

## 2024-06-10 PROCEDURE — 51729 CYSTOMETROGRAM W/VP&UP: CPT | Mod: 26

## 2024-06-10 PROCEDURE — 81003 URINALYSIS AUTO W/O SCOPE: CPT | Mod: QW

## 2024-06-11 ENCOUNTER — RESULT CHARGE (OUTPATIENT)
Age: 49
End: 2024-06-11

## 2024-06-11 VITALS — HEART RATE: 83 BPM | DIASTOLIC BLOOD PRESSURE: 79 MMHG | SYSTOLIC BLOOD PRESSURE: 116 MMHG

## 2024-06-11 PROBLEM — N39.3 STRESS INCONTINENCE: Status: ACTIVE | Noted: 2024-06-11

## 2024-06-11 LAB
BILIRUB UR QL STRIP: NEGATIVE
CLARITY UR: CLEAR
COLLECTION METHOD: NORMAL
GLUCOSE UR-MCNC: NEGATIVE
HCG UR QL: 0.2 EU/DL
HGB UR QL STRIP.AUTO: NEGATIVE
KETONES UR-MCNC: NEGATIVE
LEUKOCYTE ESTERASE UR QL STRIP: NEGATIVE
NITRITE UR QL STRIP: NEGATIVE
PH UR STRIP: 5.5
PROT UR STRIP-MCNC: NEGATIVE
SP GR UR STRIP: 1.01

## 2024-06-17 ENCOUNTER — APPOINTMENT (OUTPATIENT)
Dept: UROGYNECOLOGY | Facility: CLINIC | Age: 49
End: 2024-06-17
Payer: COMMERCIAL

## 2024-06-17 DIAGNOSIS — N39.3 STRESS INCONTINENCE (FEMALE) (MALE): ICD-10-CM

## 2024-06-17 DIAGNOSIS — N39.41 URGE INCONTINENCE: ICD-10-CM

## 2024-06-17 DIAGNOSIS — N32.81 OVERACTIVE BLADDER: ICD-10-CM

## 2024-06-17 PROCEDURE — 99214 OFFICE O/P EST MOD 30 MIN: CPT | Mod: 95

## 2024-06-17 NOTE — DISCUSSION/SUMMARY
[FreeTextEntry1] : PURA:  We reviewed management options for stress urinary incontinence including: observation, pelvic floor exercises, continence devices, periurethral bulking agents,  and surgical management. We discussed surgical management options.  We reviewed risks and benefits of each procedure. She desires surgery with a sling. She will RTO for preop counseling.   UUI/OAB: -Continue behavioral and fluid modifications

## 2024-06-17 NOTE — HISTORY OF PRESENT ILLNESS
[Rectal Prolapse] : no [Unable To Restrain Bowel Movement] : moderate [Feelings Of Urinary Urgency] : no [x1] : nocturia once nightly [] : yes [Stool Visible Blood] : no [Incomplete Emptying Of Stool] : no [Other Location: e.g. School (Enter Location, City,State)___] : at [unfilled], at the time of the visit. [Medical Office: ([facility])___] : at the medical office located in  [de-identified] : improved with behavioral and fluid modifications  [de-identified] : improved with behavioral and fluid modifications  [FreeTextEntry1] : Elsa presents for follow up and discussion of urodynamic test results. She underwent urodynamics which revealed Stress urinary incontinence. We discussed that although her test was negative for detrusor overactivity, she may still have an overactive bladder. She has been following behavioral and fluid modifications and reports improvement  PMH: Factor V Leiden (took lovenox during pregnancy and heparin after, no h/o clot), MTHFR mutation, sinus arrthymia PSH: laparoscopic surgery for endo On wegovy

## 2024-09-10 ENCOUNTER — OUTPATIENT (OUTPATIENT)
Dept: OUTPATIENT SERVICES | Facility: HOSPITAL | Age: 49
LOS: 1 days | End: 2024-09-10
Payer: COMMERCIAL

## 2024-09-10 VITALS
TEMPERATURE: 98 F | HEART RATE: 66 BPM | WEIGHT: 160.94 LBS | HEIGHT: 68 IN | RESPIRATION RATE: 16 BRPM | OXYGEN SATURATION: 99 % | DIASTOLIC BLOOD PRESSURE: 81 MMHG | SYSTOLIC BLOOD PRESSURE: 124 MMHG

## 2024-09-10 DIAGNOSIS — Z01.818 ENCOUNTER FOR OTHER PREPROCEDURAL EXAMINATION: ICD-10-CM

## 2024-09-10 DIAGNOSIS — Z98.89 OTHER SPECIFIED POSTPROCEDURAL STATES: Chronic | ICD-10-CM

## 2024-09-10 DIAGNOSIS — N39.3 STRESS INCONTINENCE (FEMALE) (MALE): ICD-10-CM

## 2024-09-10 DIAGNOSIS — C43.22 MALIGNANT MELANOMA OF LEFT EAR AND EXTERNAL AURICULAR CANAL: Chronic | ICD-10-CM

## 2024-09-10 LAB
ANION GAP SERPL CALC-SCNC: 13 MMOL/L — SIGNIFICANT CHANGE UP (ref 5–17)
BUN SERPL-MCNC: 10 MG/DL — SIGNIFICANT CHANGE UP (ref 7–23)
CALCIUM SERPL-MCNC: 9.1 MG/DL — SIGNIFICANT CHANGE UP (ref 8.4–10.5)
CHLORIDE SERPL-SCNC: 102 MMOL/L — SIGNIFICANT CHANGE UP (ref 96–108)
CO2 SERPL-SCNC: 25 MMOL/L — SIGNIFICANT CHANGE UP (ref 22–31)
CREAT SERPL-MCNC: 1.03 MG/DL — SIGNIFICANT CHANGE UP (ref 0.5–1.3)
EGFR: 66 ML/MIN/1.73M2 — SIGNIFICANT CHANGE UP
GLUCOSE SERPL-MCNC: 75 MG/DL — SIGNIFICANT CHANGE UP (ref 70–99)
HCT VFR BLD CALC: 37.8 % — SIGNIFICANT CHANGE UP (ref 34.5–45)
HGB BLD-MCNC: 12.6 G/DL — SIGNIFICANT CHANGE UP (ref 11.5–15.5)
MCHC RBC-ENTMCNC: 28.1 PG — SIGNIFICANT CHANGE UP (ref 27–34)
MCHC RBC-ENTMCNC: 33.3 GM/DL — SIGNIFICANT CHANGE UP (ref 32–36)
MCV RBC AUTO: 84.2 FL — SIGNIFICANT CHANGE UP (ref 80–100)
NRBC # BLD: 0 /100 WBCS — SIGNIFICANT CHANGE UP (ref 0–0)
PLATELET # BLD AUTO: 257 K/UL — SIGNIFICANT CHANGE UP (ref 150–400)
POTASSIUM SERPL-MCNC: 4.2 MMOL/L — SIGNIFICANT CHANGE UP (ref 3.5–5.3)
POTASSIUM SERPL-SCNC: 4.2 MMOL/L — SIGNIFICANT CHANGE UP (ref 3.5–5.3)
RBC # BLD: 4.49 M/UL — SIGNIFICANT CHANGE UP (ref 3.8–5.2)
RBC # FLD: 12.8 % — SIGNIFICANT CHANGE UP (ref 10.3–14.5)
SODIUM SERPL-SCNC: 140 MMOL/L — SIGNIFICANT CHANGE UP (ref 135–145)
WBC # BLD: 7.9 K/UL — SIGNIFICANT CHANGE UP (ref 3.8–10.5)
WBC # FLD AUTO: 7.9 K/UL — SIGNIFICANT CHANGE UP (ref 3.8–10.5)

## 2024-09-10 PROCEDURE — 80048 BASIC METABOLIC PNL TOTAL CA: CPT

## 2024-09-10 PROCEDURE — G0463: CPT

## 2024-09-10 PROCEDURE — 85027 COMPLETE CBC AUTOMATED: CPT

## 2024-09-10 NOTE — H&P PST ADULT - NSICDXPASTSURGICALHX_GEN_ALL_CORE_FT
PAST SURGICAL HISTORY:  H/O prior ablation treatment x2, 20 years ago    H/O shoulder surgery 2010    Melanoma of earlobe, left 2015    Vaginal delivery

## 2024-09-10 NOTE — H&P PST ADULT - ASSESSMENT
DASI score: 7.25   DASIactivity: walk/ stairs/ hosue chores w/o cpSOB  loose teeth or dentures: denies   airway mp2   DASI score: 7.25   DASIactivity: walk/ stairs/ house chores w/o cp, SOB  loose teeth or dentures: denies   airway mp2

## 2024-09-10 NOTE — H&P PST ADULT - NSICDXFAMILYHX_GEN_ALL_CORE_FT
FAMILY HISTORY:  Father  Still living? Yes, Estimated age: Age Unknown  Family history of hypertension in father, Age at diagnosis: Age Unknown    Grandparent  Still living? No  Family history of acute myocardial infarction, Age at diagnosis: Age Unknown  Family history of heart disease, Age at diagnosis: Age Unknown  Maternal family history of cancer, Age at diagnosis: Age Unknown

## 2024-09-10 NOTE — H&P PST ADULT - HISTORY OF PRESENT ILLNESS
48 year old female with PMH of Factor V Leiden followed by mary w/ stress incontinence planned for midurethral sling, cystoscopy on 10/3/2024       Weelsivlashell last dose 9/20 no GI symptoms  48 year old female with PMH of Factor V Leiden / MTHFR deficiency diagnosed during pregnancy 2008/ 2011 was on ASA and Lovenox  during pregnancies, no hematology follow up since ( pt was told no need to follow up w/ heme), hx of sinus arrythmia/ tachycardia s/p ablation followed by cardio,  hypothyroidism w/ stress incontinence planned for midurethral sling, cystoscopy on 10/3/2024       Wegovvy last dose 9/20 no GI symptoms   ***Case discussed with Anesthesia via teams regarding need for heme eval prior to surgery  48 year old female with PMH of Factor V Leiden / MTHFR deficiency diagnosed during pregnancy 2008/ 2011 was on ASA and Lovenox  during pregnancies, no DVT/PE hx, no hematology follow up since ( pt was told no need to follow up w/ heme), hx of sinus arrythmia/ tachycardia s/p ablation followed by cardio,  hypothyroidism w/ stress incontinence planned for midurethral sling, cystoscopy on 10/3/2024       Wegovvy last dose 9/20 no GI symptoms   ***Case discussed with Anesthesia via teams regarding need for heme eval prior to surgery

## 2024-09-10 NOTE — H&P PST ADULT - PROBLEM SELECTOR PLAN 1
midurethral sling  cystoscopy  PST labs send  preprocedure instructions discussed midurethral sling  cystoscopy  PST labs send  preprocedure instructions discussed  Hold Blossom x 1 week  will obtain EKG and cardiac eval

## 2024-09-10 NOTE — H&P PST ADULT - NSICDXPASTMEDICALHX_GEN_ALL_CORE_FT
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

## 2024-09-18 ENCOUNTER — APPOINTMENT (OUTPATIENT)
Dept: UROGYNECOLOGY | Facility: CLINIC | Age: 49
End: 2024-09-18
Payer: COMMERCIAL

## 2024-09-18 DIAGNOSIS — N39.3 STRESS INCONTINENCE (FEMALE) (MALE): ICD-10-CM

## 2024-09-18 DIAGNOSIS — N36.41 HYPERMOBILITY OF URETHRA: ICD-10-CM

## 2024-09-18 PROCEDURE — 99459 PELVIC EXAMINATION: CPT

## 2024-09-18 PROCEDURE — 99214 OFFICE O/P EST MOD 30 MIN: CPT

## 2024-09-20 ENCOUNTER — NON-APPOINTMENT (OUTPATIENT)
Age: 49
End: 2024-09-20

## 2024-09-20 LAB — BACTERIA UR CULT: NORMAL

## 2024-09-23 NOTE — HISTORY OF PRESENT ILLNESS
[FreeTextEntry1] : Elsa is a 49yo  P2 here for pre-surgical counseling.  Patient denies any new complaints.  She continues to desire surgical management.  Her pre-operative workup is as follows:  UDS (6/10/24): [facility]iary 403 mL, + mL, no DO, PVR 0 cc. 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

## 2024-09-23 NOTE — DISCUSSION/SUMMARY
[Visit Time ___ Minutes] : [unfilled] minutes [FreeTextEntry1] : Patient is a 49 y/o P2 who presents today to discuss surgical management of her stress urinary incontinence. We discussed options for her stress urinary incontinence including expectant management, surgical options and nonsurgical options. She continues to desire surgical management. She desires a retropubic midurethral sling with mesh.   We reviewed risks to the procedure including, but not limited to: bleeding, infection, pain, urinary retention requiring an indwelling catheter, persistence or recurrence of stress incontinence, failure of procedure to alleviate symptoms, development of overactive bladder or urge incontinence, voiding dysfunction, dyspareunia. We also extensively reviewed the risk of injury to the bladder, ureters, urethra, vagina, and bowel. We also discussed the risk of sling mesh exposure, fistula formation, neuropathy, erosion, pain, and need for reoperation. Risks were explained in layman's terms. She expressed understanding of these risks and continues to desire the planned surgical procedure. We discussed the possibility of going home with a catheter. We reviewed her hospital stay as well as preoperative and postoperative instructions. We discussed given her history of Leiden V we will likely administer anticoagulation with heparin prior to the start of the procedure.  Patient signed consent for: pelvic exam under anesthesia, retropubic midurethral sling, cystoscopy, possible anterior repair. Pt understands that pelvic exam under anesthesia can be performed by learners (medical students/residents/fellows).  Patient verbalized understanding.  All questions answered.

## 2024-09-23 NOTE — PHYSICAL EXAM
[Chaperone Present] : A chaperone was present in the examining room during all aspects of the physical examination [54302] : A chaperone was present during the pelvic exam. [No Acute Distress] : in no acute distress [Well developed] : well developed [Well Nourished] : ~L well nourished [Oriented x3] : oriented to person, place, and time [Respirations regular] : ~T respiratory rate was regular [Scar] : a scar was noted [Labia Majora] : were normal [Labia Minora] : were normal [Normal Appearance] : general appearance was normal [Estrogen Effect] : estrogen effect was observed [Normal] : no abnormalities [FreeTextEntry2] : CYNDY Ahn [Tenderness] : ~T no ~M abdominal tenderness observed [Distended] : not distended

## 2024-09-23 NOTE — HISTORY OF PRESENT ILLNESS
[FreeTextEntry1] : Elsa is a 49yo  P2 here for pre-surgical counseling.  Patient denies any new complaints.  She continues to desire surgical management.  Her pre-operative workup is as follows:  UDS (6/10/24): intermediate 403 mL, + mL, no DO, PVR 0 cc. 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

## 2024-09-23 NOTE — PHYSICAL EXAM
[Chaperone Present] : A chaperone was present in the examining room during all aspects of the physical examination [[id number]] : A chaperone was present during the pelvic exam. [No Acute Distress] : in no acute distress [Well developed] : well developed [Well Nourished] : ~L well nourished [Oriented x3] : oriented to person, place, and time [Respirations regular] : ~T respiratory rate was regular [Scar] : a scar was noted [Labia Majora] : were normal [Labia Minora] : were normal [Normal Appearance] : general appearance was normal [Estrogen Effect] : estrogen effect was observed [Normal] : no abnormalities [FreeTextEntry2] : CYNDY Ahn [Tenderness] : ~T no ~M abdominal tenderness observed [Distended] : not distended

## 2024-09-23 NOTE — DISCUSSION/SUMMARY
[Visit Time ___ Minutes] : [unfilled] minutes [FreeTextEntry1] : Patient is a 47 y/o P2 who presents today to discuss surgical management of her stress urinary incontinence. We discussed options for her stress urinary incontinence including expectant management, surgical options and nonsurgical options. She continues to desire surgical management. She desires a retropubic midurethral sling with mesh.   We reviewed risks to the procedure including, but not limited to: bleeding, infection, pain, urinary retention requiring an indwelling catheter, persistence or recurrence of stress incontinence, failure of procedure to alleviate symptoms, development of overactive bladder or urge incontinence, voiding dysfunction, dyspareunia. We also extensively reviewed the risk of injury to the bladder, ureters, urethra, vagina, and bowel. We also discussed the risk of sling mesh exposure, fistula formation, neuropathy, erosion, pain, and need for reoperation. Risks were explained in layman's terms. She expressed understanding of these risks and continues to desire the planned surgical procedure. We discussed the possibility of going home with a catheter. We reviewed her hospital stay as well as preoperative and postoperative instructions. We discussed given her history of Leiden V we will likely administer anticoagulation with heparin prior to the start of the procedure.  Patient signed consent for: pelvic exam under anesthesia, retropubic midurethral sling, cystoscopy, possible anterior repair. Pt understands that pelvic exam under anesthesia can be performed by learners (medical students/residents/fellows).  Patient verbalized understanding.  All questions answered.

## 2024-10-02 ENCOUNTER — TRANSCRIPTION ENCOUNTER (OUTPATIENT)
Age: 49
End: 2024-10-02

## 2024-10-03 ENCOUNTER — APPOINTMENT (OUTPATIENT)
Dept: UROGYNECOLOGY | Facility: HOSPITAL | Age: 49
End: 2024-10-03
Payer: COMMERCIAL

## 2024-10-03 ENCOUNTER — TRANSCRIPTION ENCOUNTER (OUTPATIENT)
Age: 49
End: 2024-10-03

## 2024-10-03 PROCEDURE — 57288 REPAIR BLADDER DEFECT: CPT

## 2024-10-03 RX ORDER — LEVOTHYROXINE SODIUM 100 MCG
1 TABLET ORAL
Refills: 0 | DISCHARGE

## 2024-10-03 RX ORDER — SUVOREXANT 20 MG/1
1 TABLET, FILM COATED ORAL
Refills: 0 | DISCHARGE

## 2024-10-03 RX ORDER — SEMAGLUTIDE 1 MG/.5ML
1.7 INJECTION, SOLUTION SUBCUTANEOUS
Refills: 0 | DISCHARGE

## 2024-10-04 ENCOUNTER — NON-APPOINTMENT (OUTPATIENT)
Age: 49
End: 2024-10-04

## 2024-10-15 ENCOUNTER — APPOINTMENT (OUTPATIENT)
Dept: UROGYNECOLOGY | Facility: CLINIC | Age: 49
End: 2024-10-15

## 2024-10-15 DIAGNOSIS — Z98.890 OTHER SPECIFIED POSTPROCEDURAL STATES: ICD-10-CM

## 2024-10-15 PROCEDURE — 99024 POSTOP FOLLOW-UP VISIT: CPT

## 2024-11-11 ENCOUNTER — APPOINTMENT (OUTPATIENT)
Dept: UROGYNECOLOGY | Facility: CLINIC | Age: 49
End: 2024-11-11
Payer: COMMERCIAL

## 2024-11-11 VITALS — SYSTOLIC BLOOD PRESSURE: 107 MMHG | OXYGEN SATURATION: 96 % | HEART RATE: 76 BPM | DIASTOLIC BLOOD PRESSURE: 73 MMHG

## 2024-11-11 PROCEDURE — 99024 POSTOP FOLLOW-UP VISIT: CPT
